# Patient Record
Sex: MALE | Race: WHITE | URBAN - METROPOLITAN AREA
[De-identification: names, ages, dates, MRNs, and addresses within clinical notes are randomized per-mention and may not be internally consistent; named-entity substitution may affect disease eponyms.]

---

## 2018-09-25 ENCOUNTER — OFFICE VISIT (OUTPATIENT)
Dept: FAMILY MEDICINE CLINIC | Age: 28
End: 2018-09-25
Payer: COMMERCIAL

## 2018-09-25 VITALS
OXYGEN SATURATION: 98 % | HEART RATE: 53 BPM | BODY MASS INDEX: 24.88 KG/M2 | WEIGHT: 173.8 LBS | DIASTOLIC BLOOD PRESSURE: 70 MMHG | HEIGHT: 70 IN | SYSTOLIC BLOOD PRESSURE: 120 MMHG

## 2018-09-25 DIAGNOSIS — N45.1 CHRONIC EPIDIDYMITIS: ICD-10-CM

## 2018-09-25 DIAGNOSIS — M54.50 ACUTE LEFT-SIDED LOW BACK PAIN WITHOUT SCIATICA: ICD-10-CM

## 2018-09-25 DIAGNOSIS — Z00.00 HEALTHCARE MAINTENANCE: Primary | ICD-10-CM

## 2018-09-25 DIAGNOSIS — R29.898 ABNORMAL MUSCLE TONE: ICD-10-CM

## 2018-09-25 DIAGNOSIS — E78.5 DYSLIPIDEMIA: ICD-10-CM

## 2018-09-25 LAB
BILIRUBIN, POC: NORMAL
BLOOD URINE, POC: NORMAL
CLARITY, POC: NORMAL
COLOR, POC: NORMAL
GLUCOSE URINE, POC: NORMAL
KETONES, POC: NORMAL
LEUKOCYTE EST, POC: NORMAL
NITRITE, POC: NORMAL
PH, POC: 6
PROTEIN, POC: NORMAL
SPECIFIC GRAVITY, POC: >=1.03
UROBILINOGEN, POC: 0.2

## 2018-09-25 PROCEDURE — 81002 URINALYSIS NONAUTO W/O SCOPE: CPT | Performed by: FAMILY MEDICINE

## 2018-09-25 PROCEDURE — 99385 PREV VISIT NEW AGE 18-39: CPT | Performed by: FAMILY MEDICINE

## 2018-09-25 ASSESSMENT — PATIENT HEALTH QUESTIONNAIRE - PHQ9
SUM OF ALL RESPONSES TO PHQ QUESTIONS 1-9: 0
SUM OF ALL RESPONSES TO PHQ9 QUESTIONS 1 & 2: 0
2. FEELING DOWN, DEPRESSED OR HOPELESS: 0
SUM OF ALL RESPONSES TO PHQ QUESTIONS 1-9: 0
1. LITTLE INTEREST OR PLEASURE IN DOING THINGS: 0

## 2018-09-25 NOTE — PATIENT INSTRUCTIONS
[email or web address] and sign in to your [employer] account. Enter C369 in the [employer] [employer] box to learn more about \"Back Stretches: Exercises. \"     If you do not have an account, please click on the \"Sign Up Now\" link. Current as of: November 29, 2017  Content Version: 11.7  © 0742-8351 [employer] Lessons Only. Care instructions adapted under license by [facility] ([facility]). If you have questions about a medical condition or this instruction, always ask your healthcare professional. [address] any warranty or liability for your use of this information.

## 2018-09-25 NOTE — PROGRESS NOTES
Nina Melendrez   YOB: 1990    Date of Visit:  9/25/2018    Chief Complaint   Patient presents with   [address]     moved back to area    Back Pain     left side flank pain x 2 weeks, getting worse    Other     epididymitis - recurring        No Known Allergies  No outpatient prescriptions have been marked as taking for the 9/25/18 encounter (Office Visit) with Victoria Fitzgerald MD.         Vitals:    09/25/18 0808   BP: 120/70   Site: Left Upper Arm   Position: Sitting   Cuff Size: Medium Adult   Pulse: 53   SpO2: 98%   Weight: 173 lb 12.8 oz (78.8 kg)   Height: 5' 10\" (1.778 m)     Body mass index is 24.94 kg/m². Wt Readings from Last 3 Encounters:   09/25/18 173 lb 12.8 oz (78.8 kg)   02/26/13 174 lb (78.9 kg)   03/09/11 176 lb (79.8 kg)     BP Readings from Last 3 Encounters:   09/25/18 120/70   02/26/13 118/78   03/09/11 120/70        HPI    Anish Astorga presents to establish care. he has the following concerns today:    Back pain: Left mid back \"discomfort\". Present for 2 weeks. Worse when sitting. Constant when sitting. If lying on his back it isn't that bad. No injury. Seems worse then it was initially. No interventions taken. Makes him feel slightly nauseated at times. Does not wake him up at night. No numbness or tingling in the extremities. No incontinence. History of recurrent epididymitis:  Hx of this 3 times in the past.  Hx of taking doxycycline once that didn't work. Most recently had an injection of rocephen and ? Tablets and that regimen worked. Continues to have pain off an on. Currently not really bothering him. 1st episode was 2015. Hx of US that showed it was related to epididymitis in the past.     ADD: Hx of taking adderall. Worked for some time. Currently off meds and doing fine. Hyperlipidemia:  Tries to watch what he eats but not really doing that as much recnetly.  .     L hip prominence:  He feels like there is an area on his L hip that has gotten slightly

## 2018-09-27 LAB
C. TRACHOMATIS DNA ,URINE: NEGATIVE
N. GONORRHOEAE DNA, URINE: NEGATIVE
URINE CULTURE, ROUTINE: NORMAL